# Patient Record
Sex: MALE | Race: WHITE | RURAL
[De-identification: names, ages, dates, MRNs, and addresses within clinical notes are randomized per-mention and may not be internally consistent; named-entity substitution may affect disease eponyms.]

---

## 2019-02-20 ENCOUNTER — APPOINTMENT (RX ONLY)
Dept: RURAL CLINIC 1 | Facility: CLINIC | Age: 44
Setting detail: DERMATOLOGY
End: 2019-02-20

## 2019-02-20 DIAGNOSIS — L57.3 POIKILODERMA OF CIVATTE: ICD-10-CM

## 2019-02-20 DIAGNOSIS — L60.3 NAIL DYSTROPHY: ICD-10-CM

## 2019-02-20 PROBLEM — L85.3 XEROSIS CUTIS: Status: ACTIVE | Noted: 2019-02-20

## 2019-02-20 PROCEDURE — ? COUNSELING

## 2019-02-20 PROCEDURE — ? TREATMENT REGIMEN

## 2019-02-20 PROCEDURE — 99202 OFFICE O/P NEW SF 15 MIN: CPT

## 2019-02-20 ASSESSMENT — LOCATION DETAILED DESCRIPTION DERM
LOCATION DETAILED: LEFT CLAVICULAR SKIN
LOCATION DETAILED: LEFT INFERIOR ANTERIOR NECK
LOCATION DETAILED: LEFT THUMBNAIL
LOCATION DETAILED: RIGHT THUMBNAIL

## 2019-02-20 ASSESSMENT — LOCATION SIMPLE DESCRIPTION DERM
LOCATION SIMPLE: LEFT THUMB
LOCATION SIMPLE: LEFT ANTERIOR NECK
LOCATION SIMPLE: RIGHT HAND
LOCATION SIMPLE: LEFT CLAVICULAR SKIN

## 2019-02-20 ASSESSMENT — LOCATION ZONE DERM
LOCATION ZONE: FINGERNAIL
LOCATION ZONE: TRUNK
LOCATION ZONE: NECK

## 2019-02-20 NOTE — HPI: RASH
What Type Of Note Output Would You Prefer (Optional)?: Bullet Format
How Severe Is Your Rash?: mild
Is This A New Presentation, Or A Follow-Up?: Rash
Additional History: Patient reports that it’s seems to be sensitive to heat. Patient has recently moved to [place] and has seen another dermatologist and wants Dr. Long to look at the rash and see what she thinks before telling us what the other   diagnosed.

## 2019-02-20 NOTE — PROCEDURE: TREATMENT REGIMEN
Detail Level: Zone
Plan: Discussed using Rhofade or Mirvaso, 4 drops on the face in the morning. Discussed continuing the IPL laser at [facility] in [place].